# Patient Record
(demographics unavailable — no encounter records)

---

## 2024-10-30 NOTE — HEALTH RISK ASSESSMENT
[Fair] :  ~his/her~ mood as fair [No] : In the past 12 months have you used drugs other than those required for medical reasons? No [No falls in past year] : Patient reported no falls in the past year [Little interest or pleasure doing things] : 1) Little interest or pleasure doing things [Feeling down, depressed, or hopeless] : 2) Feeling down, depressed, or hopeless [0] : 2) Feeling down, depressed, or hopeless: Not at all (0) [PHQ-2 Negative - No further assessment needed] : PHQ-2 Negative - No further assessment needed [Never] : Never [NO] : No [Patient reported mammogram was normal] : Patient reported mammogram was normal [Patient reported PAP Smear was normal] : Patient reported PAP Smear was normal [Patient reported bone density results were abnormal] : Patient reported bone density results were abnormal [Patient reported colonoscopy was abnormal] : Patient reported colonoscopy was abnormal [With Family] : lives with family [# of Members in Household ___] :  household currently consist of [unfilled] member(s) [Retired] : retired [Less Than High School] : less than high school [] :  [# Of Children ___] : has [unfilled] children [Feels Safe at Home] : Feels safe at home [Fully functional (bathing, dressing, toileting, transferring, walking, feeding)] : Fully functional (bathing, dressing, toileting, transferring, walking, feeding) [Fully functional (using the telephone, shopping, preparing meals, housekeeping, doing laundry, using] : Fully functional and needs no help or supervision to perform IADLs (using the telephone, shopping, preparing meals, housekeeping, doing laundry, using transportation, managing medications and managing finances) [Reports normal functional visual acuity (ie: able to read med bottle)] : Reports normal functional visual acuity [Smoke Detector] : smoke detector [Carbon Monoxide Detector] : carbon monoxide detector [Seat Belt] :  uses seat belt [Sunscreen] : uses sunscreen [Designated Healthcare Proxy] : Designated healthcare proxy [Name: ___] : Health Care Proxy's Name: [unfilled]  [Relationship: ___] : Relationship: [unfilled] [de-identified] : Maintains active by walking a little. [de-identified] : Admits her diet could be better.  [[id number]] : 0 [Reports changes in hearing] : Reports no changes in hearing [Reports changes in vision] : Reports no changes in vision [Reports changes in dental health] : Reports no changes in dental health [Travel to Developing Areas] : does not  travel to developing areas [TB Exposure] : is not being exposed to tuberculosis [Caregiver Concerns] : does not have caregiver concerns [MammogramDate] : 10/2024 [PapSmearDate] : 2023 [BoneDensityDate] : 12/2022 [BoneDensityComments] : Osteoporosis.  [ColonoscopyDate] : 04/2018 [ColonoscopyComments] : Benign polyps removed. [de-identified] : Lives with her .  [FreeTextEntry3] : Has one son and one daughter. [de-identified] : Hearing loss, hasn't seen an ENT. [de-identified] : Follows with ophthalmologist. Wears reading glasses.  [de-identified] : Left lower tooth extracted. Follows with a dentist.

## 2024-10-30 NOTE — HISTORY OF PRESENT ILLNESS
[de-identified] : Ms. MARK FUENTES is a 74-year-old female with hx. of aortic stenosis, arthritis of knee, HTN, HLD, mitral regurgitation, and osteoporosis, ambulating with a cane and acompannied by her  who presents for an annual physical. Patient is doing well. Reports walking daily and is compliant with her medications. Pt. states she no longer uses mupirocin and hasn't had a Prolia injection this year.   She will follow up for a bone density test, and based on the results, the doctor will determine whether to start Prolia injections. She recently followed up with head and neck specialist for thyroid nodule > had biopsy done  Patient c/o RT knee arthralgia that interferes with her walking. She followed up with multiple orthopedics a long time ago and was told to walk and exercise daily. Her and her  started physical therapy but stopped d/t no improvements. She reports daily vitamin D3 intake.   Has not received her flu vaccine yet. Reports following up with ophthalmologist. Denies CP, SOB, N/V, C/D. She is otherwise well and offers no additional complaints.

## 2024-10-30 NOTE — PHYSICAL EXAM
[No Acute Distress] : no acute distress [Well Nourished] : well nourished [Well Developed] : well developed [Well-Appearing] : well-appearing [Normal Sclera/Conjunctiva] : normal sclera/conjunctiva [PERRL] : pupils equal round and reactive to light [EOMI] : extraocular movements intact [Normal Outer Ear/Nose] : the outer ears and nose were normal in appearance [Normal Oropharynx] : the oropharynx was normal [No JVD] : no jugular venous distention [No Lymphadenopathy] : no lymphadenopathy [Supple] : supple [Thyroid Normal, No Nodules] : the thyroid was normal and there were no nodules present [No Respiratory Distress] : no respiratory distress  [No Accessory Muscle Use] : no accessory muscle use [Clear to Auscultation] : lungs were clear to auscultation bilaterally [Normal Rate] : normal rate  [Regular Rhythm] : with a regular rhythm [Normal S1, S2] : normal S1 and S2 [No Murmur] : no murmur heard [No Carotid Bruits] : no carotid bruits [No Abdominal Bruit] : a ~M bruit was not heard ~T in the abdomen [No Varicosities] : no varicosities [Pedal Pulses Present] : the pedal pulses are present [No Edema] : there was no peripheral edema [No Palpable Aorta] : no palpable aorta [No Extremity Clubbing/Cyanosis] : no extremity clubbing/cyanosis [Soft] : abdomen soft [Non Tender] : non-tender [Non-distended] : non-distended [No Masses] : no abdominal mass palpated [No HSM] : no HSM [Normal Bowel Sounds] : normal bowel sounds [Normal Posterior Cervical Nodes] : no posterior cervical lymphadenopathy [Normal Anterior Cervical Nodes] : no anterior cervical lymphadenopathy [No CVA Tenderness] : no CVA  tenderness [No Spinal Tenderness] : no spinal tenderness [No Joint Swelling] : no joint swelling [Grossly Normal Strength/Tone] : grossly normal strength/tone [No Rash] : no rash [Coordination Grossly Intact] : coordination grossly intact [No Focal Deficits] : no focal deficits [Normal Gait] : normal gait [Deep Tendon Reflexes (DTR)] : deep tendon reflexes were 2+ and symmetric [Normal Affect] : the affect was normal [Normal Insight/Judgement] : insight and judgment were intact [de-identified] : + DANUTA 2/6 RSB [de-identified] : + RT knee tenderness with ROM [de-identified] : ambulate with cane

## 2024-10-30 NOTE — HEALTH RISK ASSESSMENT
[Fair] :  ~his/her~ mood as fair [No] : In the past 12 months have you used drugs other than those required for medical reasons? No [No falls in past year] : Patient reported no falls in the past year [Little interest or pleasure doing things] : 1) Little interest or pleasure doing things [Feeling down, depressed, or hopeless] : 2) Feeling down, depressed, or hopeless [0] : 2) Feeling down, depressed, or hopeless: Not at all (0) [PHQ-2 Negative - No further assessment needed] : PHQ-2 Negative - No further assessment needed [Never] : Never [NO] : No [Patient reported mammogram was normal] : Patient reported mammogram was normal [Patient reported PAP Smear was normal] : Patient reported PAP Smear was normal [Patient reported bone density results were abnormal] : Patient reported bone density results were abnormal [Patient reported colonoscopy was abnormal] : Patient reported colonoscopy was abnormal [With Family] : lives with family [# of Members in Household ___] :  household currently consist of [unfilled] member(s) [Retired] : retired [Less Than High School] : less than high school [] :  [# Of Children ___] : has [unfilled] children [Feels Safe at Home] : Feels safe at home [Fully functional (bathing, dressing, toileting, transferring, walking, feeding)] : Fully functional (bathing, dressing, toileting, transferring, walking, feeding) [Fully functional (using the telephone, shopping, preparing meals, housekeeping, doing laundry, using] : Fully functional and needs no help or supervision to perform IADLs (using the telephone, shopping, preparing meals, housekeeping, doing laundry, using transportation, managing medications and managing finances) [Reports normal functional visual acuity (ie: able to read med bottle)] : Reports normal functional visual acuity [Smoke Detector] : smoke detector [Carbon Monoxide Detector] : carbon monoxide detector [Seat Belt] :  uses seat belt [Sunscreen] : uses sunscreen [Designated Healthcare Proxy] : Designated healthcare proxy [Name: ___] : Health Care Proxy's Name: [unfilled]  [Relationship: ___] : Relationship: [unfilled] [de-identified] : Maintains active by walking a little. [de-identified] : Admits her diet could be better.  [[id number]] : 0 [Reports changes in hearing] : Reports no changes in hearing [Reports changes in vision] : Reports no changes in vision [Reports changes in dental health] : Reports no changes in dental health [Travel to Developing Areas] : does not  travel to developing areas [TB Exposure] : is not being exposed to tuberculosis [Caregiver Concerns] : does not have caregiver concerns [MammogramDate] : 10/2024 [PapSmearDate] : 2023 [BoneDensityDate] : 12/2022 [BoneDensityComments] : Osteoporosis.  [ColonoscopyDate] : 04/2018 [ColonoscopyComments] : Benign polyps removed. [de-identified] : Lives with her .  [FreeTextEntry3] : Has one son and one daughter. [de-identified] : Hearing loss, hasn't seen an ENT. [de-identified] : Follows with ophthalmologist. Wears reading glasses.  [de-identified] : Left lower tooth extracted. Follows with a dentist.

## 2024-10-30 NOTE — PLAN
[FreeTextEntry1] : Annual physical exam. See plan.  Osteoporosis continue calcium and Vit D3 supplements Not compliant with Prolia injections Will order bone density to follow-up on osteoporosis  Elevated Hb A1C Dietary counseling given, dietary avoidance discussed, diet and exercise reviewed with patient; patient reminded of importance of aerobic exercise, weight control, dietary compliance. Need low carb diet/ exercise.  HTN/HLD/AS Low sodium, low cholesterol diet/ increased physical activity cont Lisinopril 10 mg daily cont Simvastatin 5 mg daily-- renewed today Cardiology f/u   Declined pneumonia vaccine. Declined Shingles vaccine.   Flu vaccine administered. Bloodwork ordered. Pt. instructed to f/u in 3 months.

## 2024-10-30 NOTE — PHYSICAL EXAM
[No Acute Distress] : no acute distress [Well Nourished] : well nourished [Well Developed] : well developed [Well-Appearing] : well-appearing [Normal Sclera/Conjunctiva] : normal sclera/conjunctiva [PERRL] : pupils equal round and reactive to light [EOMI] : extraocular movements intact [Normal Outer Ear/Nose] : the outer ears and nose were normal in appearance [Normal Oropharynx] : the oropharynx was normal [No JVD] : no jugular venous distention [No Lymphadenopathy] : no lymphadenopathy [Supple] : supple [Thyroid Normal, No Nodules] : the thyroid was normal and there were no nodules present [No Respiratory Distress] : no respiratory distress  [No Accessory Muscle Use] : no accessory muscle use [Clear to Auscultation] : lungs were clear to auscultation bilaterally [Normal Rate] : normal rate  [Regular Rhythm] : with a regular rhythm [Normal S1, S2] : normal S1 and S2 [No Murmur] : no murmur heard [No Carotid Bruits] : no carotid bruits [No Abdominal Bruit] : a ~M bruit was not heard ~T in the abdomen [No Varicosities] : no varicosities [Pedal Pulses Present] : the pedal pulses are present [No Edema] : there was no peripheral edema [No Palpable Aorta] : no palpable aorta [No Extremity Clubbing/Cyanosis] : no extremity clubbing/cyanosis [Soft] : abdomen soft [Non Tender] : non-tender [Non-distended] : non-distended [No Masses] : no abdominal mass palpated [No HSM] : no HSM [Normal Bowel Sounds] : normal bowel sounds [Normal Posterior Cervical Nodes] : no posterior cervical lymphadenopathy [Normal Anterior Cervical Nodes] : no anterior cervical lymphadenopathy [No CVA Tenderness] : no CVA  tenderness [No Spinal Tenderness] : no spinal tenderness [No Joint Swelling] : no joint swelling [Grossly Normal Strength/Tone] : grossly normal strength/tone [No Rash] : no rash [Coordination Grossly Intact] : coordination grossly intact [No Focal Deficits] : no focal deficits [Normal Gait] : normal gait [Deep Tendon Reflexes (DTR)] : deep tendon reflexes were 2+ and symmetric [Normal Affect] : the affect was normal [Normal Insight/Judgement] : insight and judgment were intact [de-identified] : + DANUTA 2/6 RSB [de-identified] : + RT knee tenderness with ROM [de-identified] : ambulate with cane

## 2024-10-30 NOTE — HISTORY OF PRESENT ILLNESS
[de-identified] : Ms. MARK FUENTES is a 74-year-old female with hx. of aortic stenosis, arthritis of knee, HTN, HLD, mitral regurgitation, and osteoporosis, ambulating with a cane and acompannied by her  who presents for an annual physical. Patient is doing well. Reports walking daily and is compliant with her medications. Pt. states she no longer uses mupirocin and hasn't had a Prolia injection this year.   She will follow up for a bone density test, and based on the results, the doctor will determine whether to start Prolia injections. She recently followed up with head and neck specialist for thyroid nodule > had biopsy done  Patient c/o RT knee arthralgia that interferes with her walking. She followed up with multiple orthopedics a long time ago and was told to walk and exercise daily. Her and her  started physical therapy but stopped d/t no improvements. She reports daily vitamin D3 intake.   Has not received her flu vaccine yet. Reports following up with ophthalmologist. Denies CP, SOB, N/V, C/D. She is otherwise well and offers no additional complaints.

## 2024-12-20 NOTE — REASON FOR VISIT
[Follow-Up Visit] : a follow-up visit for
[Follow-Up Visit] : a follow-up visit for
(1) Oriented to own ability

## 2024-12-26 NOTE — HISTORY OF PRESENT ILLNESS
[FreeTextEntry1] : Patient presents today because of recurrent left hallux fibular ingrown nail. It is inflamed and painful. It is red and irritated. We discussed permanent procedure, which is what she presents asking for.

## 2024-12-26 NOTE — PHYSICAL EXAM
[Delayed in the Right Toes] : capillary refills normal in right toes [Delayed in the Left Toes] : capillary refills normal in the left toes [2+] : left foot posterior tibialis 2+ [1+] : left foot dorsalis pedis 1+ [No Joint Swelling] : no joint swelling [] : normal strength/tone [Normal Foot/Ankle] : Both lower extremities were exposed and visualized. Standing exam demonstrates normal foot posture and alignment. Hindfoot exam shows no hindfoot valgus or varus [FreeTextEntry1] : It is deeply ingrown left hallux fibular border with early signs of paronychia, inflammation and a lot of sensitivity. [Sensation] : the sensory exam was normal to light touch and pinprick [No Focal Deficits] : no focal deficits [Deep Tendon Reflexes (DTR)] : deep tendon reflexes were 2+ and symmetric [Motor Exam] : the motor exam was normal

## 2024-12-26 NOTE — ASSESSMENT
[FreeTextEntry1] : Impression: ingrown left hallux fibular border. Paronychia.  Treatment: I went through detailed consent for left hallux fibular border permanent nail avulsion. Consent was signed.  After obtaining verbal consent, a time out was performed to the identified area of maximal tenderness. A debridement tray was opened. I injected 2cc's Xylocaine 1% after prepping the area with alcohol and Ethyl Chloride. Attention was then drawn to left hallux and a Penrose drain was placed about the base. The left hallux fibular border was then excised en toto, using a #316 blade and an English nail splitter. Under tourniquet the groove was then chemically cauterized using Phenol; the matrix was cauterized using Phenol, 3 applications at 30 seconds apiece.  This was then flushed copiously with alcohol. An antibiotic dry sterile dressing was applied. Tourniquet removed and normal hyperemic flush was noted to the digit. The patient tolerated the procedure and anesthesia well. The patient left this office with neurovascular status intact to the left foot. Patient was given written post-operative instructions.  Discussed postop. care. I gave her supplies. Patient is to follow-up with continued symptomatology.  Call the office with any issues whatsoever.

## 2025-01-02 NOTE — PHYSICAL EXAM
[No Acute Distress] : no acute distress [Well Nourished] : well nourished [Well Developed] : well developed [Well-Appearing] : well-appearing [Normal Sclera/Conjunctiva] : normal sclera/conjunctiva [Normal Outer Ear/Nose] : the outer ears and nose were normal in appearance [No JVD] : no jugular venous distention [No Lymphadenopathy] : no lymphadenopathy [Supple] : supple [No Respiratory Distress] : no respiratory distress  [No Accessory Muscle Use] : no accessory muscle use [Clear to Auscultation] : lungs were clear to auscultation bilaterally [Normal Rate] : normal rate  [Regular Rhythm] : with a regular rhythm [Normal S1, S2] : normal S1 and S2 [Pedal Pulses Present] : the pedal pulses are present [No Edema] : there was no peripheral edema [No Extremity Clubbing/Cyanosis] : no extremity clubbing/cyanosis [Soft] : abdomen soft [Non Tender] : non-tender [Non-distended] : non-distended [Normal Posterior Cervical Nodes] : no posterior cervical lymphadenopathy [Normal Anterior Cervical Nodes] : no anterior cervical lymphadenopathy [No CVA Tenderness] : no CVA  tenderness [No Spinal Tenderness] : no spinal tenderness [No Joint Swelling] : no joint swelling [Grossly Normal Strength/Tone] : grossly normal strength/tone [No Rash] : no rash [Coordination Grossly Intact] : coordination grossly intact [No Focal Deficits] : no focal deficits [Normal Gait] : normal gait [Normal Affect] : the affect was normal [Normal Insight/Judgement] : insight and judgment were intact

## 2025-04-02 NOTE — REVIEW OF SYSTEMS
[Hearing Loss] : hearing loss [Joint Pain] : joint pain [Negative] : Heme/Lymph [Earache] : no earache [Sore Throat] : no sore throat [FreeTextEntry9] : left knee pain

## 2025-04-02 NOTE — HISTORY OF PRESENT ILLNESS
[de-identified] : Ms. MARK FUENTES is a 75 year old female with Hx of aortic stenosis, arthritis of knee, HTN, HLD, mitral regurgitation, arthritis of knee, and osteoporosis, presenting for a follow up visit. She is accompanied by her .  Pt reports she is no longer on Prolia and was started on Raloxifene 60mg daily by her endocrinologist. She reports she is only on raloxifene and simvastatin.  Pt c/o b/l hearing loss, worse in her left ear Pt reports she is walking less d/t left knee pain. She states she has followed with Ortho was told it was OA. She has been using voltaren gel.   She is otherwise feeling well, compliant with her medications, and offers no complaints. Denies any CP, SOB, or abdominal pain.

## 2025-04-02 NOTE — PLAN
[FreeTextEntry1] : Follow up   Decreased hearing b/l ear, left > right  Referred to ENT   Arthritis of left knee follows with Ortho  Diclofenac gel as needed/Tylenol as needed  Osteoporosis off Prolia  cont. raloxifene 60mg daily  follows with endocrinology continue calcium and Vit D3 supplements  HTN reinforced importance of low sodium, low cholesterol diet/ increased physical activity d/c Lisinopril 10 mg daily  HLD cont Simvastatin 5 mg daily reinforced the importance of following a low cholesterol/low fat diet and increased physical activity. We'll check lipids and LFTs today.  Bloodwork ordered.  Follow up in one week for lab results.

## 2025-04-02 NOTE — ADDENDUM
[FreeTextEntry1] : Documented by Chelsie Santos acting as a scribe for Dr. Malik. 04/02/2025   All medical record entries made by the scribe were at my, Dr. Malik, direction and personally dictated by me on 04/02/2025. I have reviewed the chart an agree that the record accurately reflects my personal performance of the history, physical exam, assessment and plan. I have also personally directed, reviewed, and agreed with the chart.

## 2025-04-02 NOTE — PHYSICAL EXAM
[No Acute Distress] : no acute distress [Well Nourished] : well nourished [Well Developed] : well developed [Well-Appearing] : well-appearing [Normal Sclera/Conjunctiva] : normal sclera/conjunctiva [Normal Outer Ear/Nose] : the outer ears and nose were normal in appearance [Normal Oropharynx] : the oropharynx was normal [No JVD] : no jugular venous distention [No Lymphadenopathy] : no lymphadenopathy [Supple] : supple [No Respiratory Distress] : no respiratory distress  [No Accessory Muscle Use] : no accessory muscle use [Clear to Auscultation] : lungs were clear to auscultation bilaterally [Normal Rate] : normal rate  [Regular Rhythm] : with a regular rhythm [Normal S1, S2] : normal S1 and S2 [Pedal Pulses Present] : the pedal pulses are present [No Edema] : there was no peripheral edema [No Extremity Clubbing/Cyanosis] : no extremity clubbing/cyanosis [Soft] : abdomen soft [Non Tender] : non-tender [Non-distended] : non-distended [Normal Posterior Cervical Nodes] : no posterior cervical lymphadenopathy [Normal Anterior Cervical Nodes] : no anterior cervical lymphadenopathy [No CVA Tenderness] : no CVA  tenderness [No Spinal Tenderness] : no spinal tenderness [No Joint Swelling] : no joint swelling [Grossly Normal Strength/Tone] : grossly normal strength/tone [No Rash] : no rash [Coordination Grossly Intact] : coordination grossly intact [No Focal Deficits] : no focal deficits [Normal Gait] : normal gait [Normal Affect] : the affect was normal [Normal Insight/Judgement] : insight and judgment were intact [EOMI] : extraocular movements intact [Normal TMs] : both tympanic membranes were normal [de-identified] : + b/l knee tenderness with ROM L > R

## 2025-04-28 NOTE — CARDIOLOGY SUMMARY
[___] : [unfilled] [LVEF ___%] : LVEF [unfilled]% [Mild] : mild mitral regurgitation [de-identified] : 4/28/2025: Sinus Rhythm at 95 beats per minute

## 2025-04-28 NOTE — HISTORY OF PRESENT ILLNESS
[FreeTextEntry1] : Patient is a 75 year old woman with a history of hypertension, dyslipidemia, thyroid nodules and osteoporosis who presents today for evaluation of HTN. She has been feeling well from the cardiac standpoint denying any chest pain, shortness of breath, palpitations, headaches or dizziness. Her major complaint at this time is bilateral knee pain.

## 2025-04-28 NOTE — DISCUSSION/SUMMARY
[EKG obtained to assist in diagnosis and management of assessed problem(s)] : EKG obtained to assist in diagnosis and management of assessed problem(s) [FreeTextEntry1] : IMPRESSION: Mrs. Koch is a 71 year old woman with a history of HTN, dyslipidemia, thyroid nodules, and osteoporosis who presents today for evaluation of HTN.  PLAN: 1. Her blood pressure is well controlled, thus she will continue on Lisinopril 10 mg daily. 2. She will continue on a low fat/ low cholesterol diet for hyperlipidemia. Her most recent LDL was good, thus she will continue on Simvastatin 5 mg daily.  3. I have asked her to schedule an echocardiogram given her aortic stenosis and HTN. 4. She will follow up with me in 1 year or sooner if she is symptomatic. 5.  We discussed the possibility of a stress test, however, she wants to hold off given that she is asymptomatic from the cardiac standpoint and also because of her knee pain.  She was in sinus rhythm on her ECG today with no significant ST-T wave abnormalities.

## 2025-04-28 NOTE — PHYSICAL EXAM
[General Appearance - Well Developed] : well developed [Normal Appearance] : normal appearance [Well Groomed] : well groomed [General Appearance - Well Nourished] : well nourished [No Deformities] : no deformities [General Appearance - In No Acute Distress] : no acute distress [Normal Conjunctiva] : the conjunctiva exhibited no abnormalities [Normal Jugular Venous A Waves Present] : normal jugular venous A waves present [Normal Jugular Venous V Waves Present] : normal jugular venous V waves present [Normal Rate] : normal [Rhythm Regular] : regular [Normal S1] : normal S1 [Normal S2] : normal S2 [II] : a grade 2 [No Pitting Edema] : no pitting edema present [Respiration, Rhythm And Depth] : normal respiratory rhythm and effort [Exaggerated Use Of Accessory Muscles For Inspiration] : no accessory muscle use [Auscultation Breath Sounds / Voice Sounds] : lungs were clear to auscultation bilaterally [Bowel Sounds] : normal bowel sounds [Abdomen Soft] : soft [Abdomen Tenderness] : non-tender [Nail Clubbing] : no clubbing of the fingernails [Cyanosis, Localized] : no localized cyanosis [Petechial Hemorrhages (___cm)] : no petechial hemorrhages [Skin Color & Pigmentation] : normal skin color and pigmentation [] : no rash [No Skin Ulcers] : no skin ulcer [Oriented To Time, Place, And Person] : oriented to person, place, and time [Affect] : the affect was normal [Mood] : the mood was normal [No Anxiety] : not feeling anxious [S3] : no S3 [Right Carotid Bruit] : no bruit heard over the right carotid [Left Carotid Bruit] : no bruit heard over the left carotid [Bruit] : no bruit heard [FreeTextEntry1] : She feels that her gait is hindered by her knee pain.

## 2025-05-30 NOTE — CONSULT LETTER
[Dear  ___] : Dear  [unfilled], [Consult Letter:] : I had the pleasure of evaluating your patient, [unfilled]. [Please see my note below.] : Please see my note below. [Consult Closing:] : Thank you very much for allowing me to participate in the care of this patient.  If you have any questions, please do not hesitate to contact me. [Sincerely,] : Sincerely, [FreeTextEntry3] : Tram Loomis MD Otolaryngology and Cranial Base Surgery  Attending Physician- Department of Otolaryngology and Head & Neck Surgery  [facility] Office: [phone]  Fax: [phone]

## 2025-05-30 NOTE — ASSESSMENT
[FreeTextEntry1] : 76y/o female who came in for worsening hearing loss L>R  Ear exam: wnl -Audio shows asymmetrical left SNHL -MRI done in 2019 and was normal  -HA clearance given along with a list of HA dispensaries -F/u annually for hearing check

## 2025-05-30 NOTE — END OF VISIT
[FreeTextEntry3] : I personally saw and examined Ms. MARK FUENTES in detail this visit today. I personally reviewed the HPI, PMH, FH, SH, ROS and medications/allergies. I have spoken to SHELBI Tam regarding the history and have personally determined the assessment and plan of care, and documented this myself. I was present and participated in all key portions of the encounter and all procedures noted above. I have made changes in the body of the note where appropriate.   Attesting Faculty: See Attending Signature Below

## 2025-05-30 NOTE — HISTORY OF PRESENT ILLNESS
[de-identified] : 76y/o female who came in for hearing loss L>R. 6 years ago she saw another ENT who removed wax and her hearing was okay.  Pt denies any ear pain, drainage, recurrent ear infections tinnitus. She has no nose or throat complaints.

## 2025-07-02 NOTE — HISTORY OF PRESENT ILLNESS
[de-identified] : Ms. MARK FUENTES is a 75 year old female with Hx of aortic stenosis, arthritis of knee, HTN, HLD, mitral regurgitation, arthritis of knee, and osteoporosis, presenting for a follow up visit. She is accompanied by her .   Pt states she has been using topical steroids  for cold sores with improvement of blisters. She reports she is active but not walking frequently due to knee pain. She states she is otherwise feeling well and is compliant with her medications. Denies any CP, SOB, or abdominal pain.

## 2025-07-02 NOTE — PLAN
[FreeTextEntry1] : Follow up   Arthritis of left knee follows with Ortho Diclofenac gel as needed/Tylenol as needed  Osteoporosis off Prolia cont. raloxifene 60mg QD follows with endocrinology continue calcium and Vit D3 supplements  HTN reinforced importance of low sodium, low cholesterol diet/ increased physical activity cont. Lisinopril 10 mg QD  HLD cont Simvastatin 5 mg QD reinforced the importance of following a low cholesterol/low fat diet and increased physical activity. We'll check lipids and LFTs today.  Bloodwork ordered. Follow up in 2 weeks for lab results.

## 2025-07-02 NOTE — ADDENDUM
[FreeTextEntry1] : Documented by Chelsie Santos acting as a scribe for Dr. Malik. 07/02/2025   All medical record entries made by the scribe were at my, Dr. Malik, direction and personally dictated by me on 07/02/2025. I have reviewed the chart an agree that the record accurately reflects my personal performance of the history, physical exam, assessment and plan. I have also personally directed, reviewed, and agreed with the chart.